# Patient Record
Sex: FEMALE | Race: WHITE
[De-identification: names, ages, dates, MRNs, and addresses within clinical notes are randomized per-mention and may not be internally consistent; named-entity substitution may affect disease eponyms.]

---

## 2018-06-02 ENCOUNTER — HOSPITAL ENCOUNTER (EMERGENCY)
Age: 43
Discharge: HOME | End: 2018-06-02

## 2018-06-02 ENCOUNTER — HOSPITAL ENCOUNTER (EMERGENCY)
Dept: HOSPITAL 91 - FTE | Age: 43
Discharge: HOME | End: 2018-06-02
Payer: MEDICAID

## 2018-06-02 DIAGNOSIS — L03.032: Primary | ICD-10-CM

## 2018-06-02 PROCEDURE — 99284 EMERGENCY DEPT VISIT MOD MDM: CPT

## 2018-06-02 RX ADMIN — IBUPROFEN 1 MG: 600 TABLET ORAL at 21:20

## 2018-09-28 ENCOUNTER — HOSPITAL ENCOUNTER (EMERGENCY)
Age: 43
Discharge: HOME | End: 2018-09-28

## 2018-09-28 ENCOUNTER — HOSPITAL ENCOUNTER (EMERGENCY)
Dept: HOSPITAL 91 - FTE | Age: 43
Discharge: HOME | End: 2018-09-28
Payer: MEDICAID

## 2018-09-28 DIAGNOSIS — S61.011A: Primary | ICD-10-CM
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
## 2018-09-28 DIAGNOSIS — Z23: ICD-10-CM
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
## 2018-09-28 PROCEDURE — 12001 RPR S/N/AX/GEN/TRNK 2.5CM/<: CPT

## 2018-09-28 PROCEDURE — 90471 IMMUNIZATION ADMIN: CPT

## 2018-09-28 PROCEDURE — 99283 EMERGENCY DEPT VISIT LOW MDM: CPT

## 2018-09-28 PROCEDURE — 90715 TDAP VACCINE 7 YRS/> IM: CPT

## 2018-09-28 RX ADMIN — CLOSTRIDIUM TETANI TOXOID ANTIGEN (FORMALDEHYDE INACTIVATED), CORYNEBACTERIUM DIPHTHERIAE TOXOID ANTIGEN (FORMALDEHYDE INACTIVATED), BORDETELLA PERTUSSIS TOXOID ANTIGEN (GLUTARALDEHYDE INACTIVATED), BORDETELLA PERTUSSIS FILAMENTOUS HEMAGGLUTININ ANTIGEN (FORMALDEHYDE INACTIVATED), BORDETELLA PERTUSSIS PERTACTIN ANTIGEN, AND BORDETELLA PERTUSSIS FIMBRIAE 2/3 ANTIGEN 1 ML: 5; 2; 2.5; 5; 3; 5 INJECTION, SUSPENSION INTRAMUSCULAR at 13:43

## 2018-09-28 RX ADMIN — LIDOCAINE HYDROCHLORIDE 1 ML: 10 INJECTION, SOLUTION INFILTRATION; PERINEURAL at 13:46

## 2019-03-10 ENCOUNTER — HOSPITAL ENCOUNTER (EMERGENCY)
Dept: HOSPITAL 91 - FTE | Age: 44
Discharge: HOME | End: 2019-03-10
Payer: MEDICAID

## 2019-03-10 ENCOUNTER — HOSPITAL ENCOUNTER (EMERGENCY)
Dept: HOSPITAL 10 - FTE | Age: 44
Discharge: HOME | End: 2019-03-10
Payer: MEDICAID

## 2019-03-10 VITALS — RESPIRATION RATE: 20 BRPM | HEART RATE: 63 BPM | DIASTOLIC BLOOD PRESSURE: 85 MMHG | SYSTOLIC BLOOD PRESSURE: 145 MMHG

## 2019-03-10 VITALS — HEIGHT: 55 IN | WEIGHT: 182.54 LBS | BODY MASS INDEX: 42.24 KG/M2

## 2019-03-10 DIAGNOSIS — K60.2: Primary | ICD-10-CM

## 2019-03-10 PROCEDURE — 99284 EMERGENCY DEPT VISIT MOD MDM: CPT
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
## 2019-07-31 ENCOUNTER — HOSPITAL ENCOUNTER (EMERGENCY)
Dept: HOSPITAL 91 - FTE | Age: 44
Discharge: HOME | End: 2019-07-31
Payer: MEDICAID

## 2019-07-31 ENCOUNTER — HOSPITAL ENCOUNTER (EMERGENCY)
Dept: HOSPITAL 10 - FTE | Age: 44
Discharge: HOME | End: 2019-07-31
Payer: MEDICAID

## 2019-07-31 VITALS
HEIGHT: 64 IN | BODY MASS INDEX: 30.11 KG/M2 | WEIGHT: 176.37 LBS | WEIGHT: 176.37 LBS | HEIGHT: 64 IN | BODY MASS INDEX: 30.11 KG/M2
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
## 2019-07-31 DIAGNOSIS — T22.011A: Primary | ICD-10-CM

## 2019-07-31 PROCEDURE — 99283 EMERGENCY DEPT VISIT LOW MDM: CPT
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
## 2019-08-20 ENCOUNTER — HOSPITAL ENCOUNTER (EMERGENCY)
Dept: HOSPITAL 10 - FTE | Age: 44
Discharge: HOME | End: 2019-08-20
Payer: MEDICAID

## 2019-08-20 ENCOUNTER — HOSPITAL ENCOUNTER (EMERGENCY)
Dept: HOSPITAL 91 - FTE | Age: 44
Discharge: HOME | End: 2019-08-20
Payer: MEDICAID

## 2019-08-20 VITALS — RESPIRATION RATE: 18 BRPM | SYSTOLIC BLOOD PRESSURE: 151 MMHG | HEART RATE: 66 BPM | DIASTOLIC BLOOD PRESSURE: 86 MMHG

## 2019-08-20 VITALS
BODY MASS INDEX: 30.34 KG/M2 | HEIGHT: 64 IN | BODY MASS INDEX: 30.34 KG/M2 | HEIGHT: 64 IN | WEIGHT: 177.69 LBS | WEIGHT: 177.69 LBS

## 2019-08-20 DIAGNOSIS — J02.9: Primary | ICD-10-CM

## 2019-08-20 PROCEDURE — 99283 EMERGENCY DEPT VISIT LOW MDM: CPT

## 2019-09-22 ENCOUNTER — HOSPITAL ENCOUNTER (EMERGENCY)
Dept: HOSPITAL 91 - FTE | Age: 44
Discharge: HOME | End: 2019-09-22
Payer: MEDICAID
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
## 2019-09-22 DIAGNOSIS — S99.922A: Primary | ICD-10-CM

## 2019-09-22 PROCEDURE — 73630 X-RAY EXAM OF FOOT: CPT

## 2019-09-22 PROCEDURE — 99283 EMERGENCY DEPT VISIT LOW MDM: CPT

## 2019-09-22 RX ADMIN — ACETAMINOPHEN 1 MG: 325 TABLET, FILM COATED ORAL at 19:08

## 2019-11-05 ENCOUNTER — HOSPITAL ENCOUNTER (EMERGENCY)
Dept: HOSPITAL 10 - FTE | Age: 44
Discharge: HOME | End: 2019-11-05
Payer: SELF-PAY

## 2019-11-05 VITALS — HEART RATE: 60 BPM | RESPIRATION RATE: 16 BRPM | SYSTOLIC BLOOD PRESSURE: 125 MMHG | DIASTOLIC BLOOD PRESSURE: 79 MMHG

## 2019-11-05 VITALS
HEIGHT: 65 IN | WEIGHT: 175.49 LBS | HEIGHT: 65 IN | WEIGHT: 175.49 LBS | BODY MASS INDEX: 29.24 KG/M2 | BODY MASS INDEX: 29.24 KG/M2

## 2019-11-05 DIAGNOSIS — R21: Primary | ICD-10-CM

## 2019-11-05 PROCEDURE — 96372 THER/PROPH/DIAG INJ SC/IM: CPT

## 2019-11-05 PROCEDURE — 99284 EMERGENCY DEPT VISIT MOD MDM: CPT
